# Patient Record
Sex: FEMALE | URBAN - METROPOLITAN AREA
[De-identification: names, ages, dates, MRNs, and addresses within clinical notes are randomized per-mention and may not be internally consistent; named-entity substitution may affect disease eponyms.]

---

## 2023-08-09 ENCOUNTER — TELEPHONE (OUTPATIENT)
Age: 78
End: 2023-08-09

## 2023-08-22 ENCOUNTER — TELEPHONE (OUTPATIENT)
Dept: OTHER | Facility: OTHER | Age: 78
End: 2023-08-22

## 2023-08-22 NOTE — TELEPHONE ENCOUNTER
p/t stated recevied letter about Hemoccult Recall Letter Sent. And needs to have labs ordered.  Please call back to schedule

## 2023-08-23 ENCOUNTER — TELEPHONE (OUTPATIENT)
Age: 78
End: 2023-08-23

## 2023-08-23 DIAGNOSIS — Z12.11 SCREENING FOR COLON CANCER: Primary | ICD-10-CM

## 2023-08-28 NOTE — TELEPHONE ENCOUNTER
Patients GI provider:  Dr. Matthew Shultz    Number to return call: [phone]    Reason for call: Pt calling wanting to know why she has to  the stuff for the stool sample. Wants to know if it can be sent to her?      Scheduled procedure/appointment date if applicable:

## 2023-10-05 ENCOUNTER — APPOINTMENT (OUTPATIENT)
Dept: LAB | Facility: HOSPITAL | Age: 78
End: 2023-10-05

## 2023-10-05 DIAGNOSIS — Z12.11 SCREENING FOR COLON CANCER: ICD-10-CM

## 2023-10-05 LAB — HEMOCCULT STL QL IA: NEGATIVE

## 2023-10-05 PROCEDURE — G0328 FECAL BLOOD SCRN IMMUNOASSAY: HCPCS

## 2024-10-03 ENCOUNTER — TELEPHONE (OUTPATIENT)
Age: 79
End: 2024-10-03

## 2024-10-03 NOTE — TELEPHONE ENCOUNTER
Patients GI provider:  Dr. Knapp    Number to return call: [phone]    Reason for call: Pt called stated she was a patient of Dr. Knapp, pt want to know If she should be doing cologuard or colonoscopy again. Would like to discuss next treatment plan. Offered to schedule an appt to see Dr. Knapp but would like to speak with someone to discuss if she should be doing cologuard yearly or colonoscopy. Patient can be reached at the above number provided    Scheduled procedure/appointment date if applicable: Apt/procedure

## 2024-10-03 NOTE — TELEPHONE ENCOUNTER
LVM for patient SHE IS NOT DUE for colonoscopy until 2027 she had one in 2022 and fit test in 2023. Unless she is having a symptoms she is not due for anything , but we can do another fit test us it is recommended yearly!